# Patient Record
Sex: MALE | Race: WHITE | Employment: FULL TIME | URBAN - METROPOLITAN AREA
[De-identification: names, ages, dates, MRNs, and addresses within clinical notes are randomized per-mention and may not be internally consistent; named-entity substitution may affect disease eponyms.]

---

## 2021-02-17 ENCOUNTER — HOSPITAL ENCOUNTER (EMERGENCY)
Facility: MEDICAL CENTER | Age: 61
End: 2021-02-17
Attending: EMERGENCY MEDICINE
Payer: COMMERCIAL

## 2021-02-17 ENCOUNTER — APPOINTMENT (OUTPATIENT)
Dept: RADIOLOGY | Facility: MEDICAL CENTER | Age: 61
End: 2021-02-17
Attending: EMERGENCY MEDICINE
Payer: COMMERCIAL

## 2021-02-17 ENCOUNTER — APPOINTMENT (OUTPATIENT)
Dept: RADIOLOGY | Facility: MEDICAL CENTER | Age: 61
End: 2021-02-17
Attending: EMERGENCY MEDICINE

## 2021-02-17 VITALS
DIASTOLIC BLOOD PRESSURE: 77 MMHG | HEART RATE: 88 BPM | HEIGHT: 67 IN | BODY MASS INDEX: 28.25 KG/M2 | WEIGHT: 180 LBS | OXYGEN SATURATION: 94 % | TEMPERATURE: 98 F | RESPIRATION RATE: 20 BRPM | SYSTOLIC BLOOD PRESSURE: 123 MMHG

## 2021-02-17 DIAGNOSIS — F10.920 ALCOHOLIC INTOXICATION WITHOUT COMPLICATION (HCC): ICD-10-CM

## 2021-02-17 DIAGNOSIS — S09.90XA CLOSED HEAD INJURY, INITIAL ENCOUNTER: ICD-10-CM

## 2021-02-17 LAB
ALBUMIN SERPL BCP-MCNC: 4.9 G/DL (ref 3.2–4.9)
ALBUMIN/GLOB SERPL: 1.4 G/DL
ALP SERPL-CCNC: 72 U/L (ref 30–99)
ALT SERPL-CCNC: 174 U/L (ref 2–50)
ANION GAP SERPL CALC-SCNC: 27 MMOL/L (ref 7–16)
AST SERPL-CCNC: 121 U/L (ref 12–45)
BASOPHILS # BLD AUTO: 1 % (ref 0–1.8)
BASOPHILS # BLD: 0.06 K/UL (ref 0–0.12)
BILIRUB SERPL-MCNC: 0.4 MG/DL (ref 0.1–1.5)
BUN SERPL-MCNC: 10 MG/DL (ref 8–22)
CALCIUM SERPL-MCNC: 9.5 MG/DL (ref 8.5–10.5)
CHLORIDE SERPL-SCNC: 97 MMOL/L (ref 96–112)
CO2 SERPL-SCNC: 18 MMOL/L (ref 20–33)
CREAT SERPL-MCNC: 0.92 MG/DL (ref 0.5–1.4)
EOSINOPHIL # BLD AUTO: 0.12 K/UL (ref 0–0.51)
EOSINOPHIL NFR BLD: 2.1 % (ref 0–6.9)
ERYTHROCYTE [DISTWIDTH] IN BLOOD BY AUTOMATED COUNT: 54.4 FL (ref 35.9–50)
ETHANOL BLD-MCNC: 443.5 MG/DL (ref 0–10)
GLOBULIN SER CALC-MCNC: 3.4 G/DL (ref 1.9–3.5)
GLUCOSE SERPL-MCNC: 92 MG/DL (ref 65–99)
HCT VFR BLD AUTO: 54.3 % (ref 42–52)
HGB BLD-MCNC: 18.2 G/DL (ref 14–18)
IMM GRANULOCYTES # BLD AUTO: 0.02 K/UL (ref 0–0.11)
IMM GRANULOCYTES NFR BLD AUTO: 0.3 % (ref 0–0.9)
LYMPHOCYTES # BLD AUTO: 2.25 K/UL (ref 1–4.8)
LYMPHOCYTES NFR BLD: 39.2 % (ref 22–41)
MCH RBC QN AUTO: 35.5 PG (ref 27–33)
MCHC RBC AUTO-ENTMCNC: 33.5 G/DL (ref 33.7–35.3)
MCV RBC AUTO: 105.8 FL (ref 81.4–97.8)
MONOCYTES # BLD AUTO: 0.44 K/UL (ref 0–0.85)
MONOCYTES NFR BLD AUTO: 7.7 % (ref 0–13.4)
NEUTROPHILS # BLD AUTO: 2.85 K/UL (ref 1.82–7.42)
NEUTROPHILS NFR BLD: 49.7 % (ref 44–72)
NRBC # BLD AUTO: 0 K/UL
NRBC BLD-RTO: 0 /100 WBC
PLATELET # BLD AUTO: 215 K/UL (ref 164–446)
PMV BLD AUTO: 9.9 FL (ref 9–12.9)
POTASSIUM SERPL-SCNC: 4.4 MMOL/L (ref 3.6–5.5)
PROT SERPL-MCNC: 8.3 G/DL (ref 6–8.2)
RBC # BLD AUTO: 5.13 M/UL (ref 4.7–6.1)
SODIUM SERPL-SCNC: 142 MMOL/L (ref 135–145)
WBC # BLD AUTO: 5.7 K/UL (ref 4.8–10.8)

## 2021-02-17 PROCEDURE — A9576 INJ PROHANCE MULTIPACK: HCPCS | Performed by: EMERGENCY MEDICINE

## 2021-02-17 PROCEDURE — 80053 COMPREHEN METABOLIC PANEL: CPT

## 2021-02-17 PROCEDURE — 99285 EMERGENCY DEPT VISIT HI MDM: CPT

## 2021-02-17 PROCEDURE — 82077 ASSAY SPEC XCP UR&BREATH IA: CPT

## 2021-02-17 PROCEDURE — 74018 RADEX ABDOMEN 1 VIEW: CPT

## 2021-02-17 PROCEDURE — 700117 HCHG RX CONTRAST REV CODE 255: Performed by: EMERGENCY MEDICINE

## 2021-02-17 PROCEDURE — 71045 X-RAY EXAM CHEST 1 VIEW: CPT

## 2021-02-17 PROCEDURE — 85025 COMPLETE CBC W/AUTO DIFF WBC: CPT

## 2021-02-17 PROCEDURE — 70553 MRI BRAIN STEM W/O & W/DYE: CPT

## 2021-02-17 PROCEDURE — 70450 CT HEAD/BRAIN W/O DYE: CPT

## 2021-02-17 PROCEDURE — 70486 CT MAXILLOFACIAL W/O DYE: CPT

## 2021-02-17 RX ADMIN — GADOTERIDOL 15 ML: 279.3 INJECTION, SOLUTION INTRAVENOUS at 19:50

## 2021-02-17 ASSESSMENT — LIFESTYLE VARIABLES
DOES PATIENT WANT TO STOP DRINKING: NO
HOW MANY TIMES IN THE PAST YEAR HAVE YOU HAD 5 OR MORE DRINKS IN A DAY: 52
ON A TYPICAL DAY WHEN YOU DRINK ALCOHOL HOW MANY DRINKS DO YOU HAVE: 1
CONSUMPTION TOTAL: INCOMPLETE
DO YOU DRINK ALCOHOL: YES
AVERAGE NUMBER OF DAYS PER WEEK YOU HAVE A DRINK CONTAINING ALCOHOL: 1

## 2021-02-17 ASSESSMENT — PAIN SCALES - WONG BAKER: WONGBAKER_NUMERICALRESPONSE: DOESN'T HURT AT ALL

## 2021-02-17 NOTE — ED TRIAGE NOTES
"Graham Wilson  60 y.o.  Chief Complaint   Patient presents with   • ETOH Intoxication     Breathylyzer result 0.304   • Alcohol Intoxication       Pt BIB EMS for ETOH.  EMS states \"pt was on a zoom call for work when his coworkers watched him slump over on his keyboard.  They called EMS.\" EMS reported finding an empty 1/5 of vodka next to pt's desk and vodka in the pt's coffee cup.    Breathalyzer result in ED 0.304. Pt calm and compliant. Poor motor control at this time. Pt alert and orient to person and time.    Vitals:    02/17/21 1547   BP: 133/78   Pulse: 75   Resp: 20   Temp: 35.9 °C (96.7 °F)   SpO2: 91%     "

## 2021-02-17 NOTE — ED PROVIDER NOTES
ED Provider Note    CHIEF COMPLAINT  Chief Complaint   Patient presents with   • ETOH Intoxication     Breathylyzer result 0.304   • Alcohol Intoxication       HPI  Graham Wilson is a 60 y.o. male who presents for evaluation of altered mental status with head and facial injury.  The patient was brought in by paramedics.  Apparently he has been working from home.  He was on a Zoom call and apparently coworkers witnessed him slumped forward and hit his head against the desk with potential brief loss of consciousness.  Paramedics arrived and found the patient grossly intoxicated with a large glass of likely vodka with some sort of mixer in it.  The patient does not provide any significant or meaningful history.  No report of injury to the upper or lower extremities chest abdomen or pelvis.  No report of seizures    REVIEW OF SYSTEMS  See HPI for further details.  No night sweats weight loss numbness tingling weakness rash all other systems are negative.     PAST MEDICAL HISTORY  No past medical history on file.  No stated medical history  FAMILY HISTORY  None reported    SOCIAL HISTORY  Social History     Socioeconomic History   • Marital status:      Spouse name: Not on file   • Number of children: Not on file   • Years of education: Not on file   • Highest education level: Not on file   Occupational History   • Not on file   Tobacco Use   • Smoking status: Former Smoker     Types: Cigarettes   • Smokeless tobacco: Never Used   • Tobacco comment: quit smoking 25 years ago   Substance and Sexual Activity   • Alcohol use: Yes     Alcohol/week: 4.2 oz     Types: 7 Shots of liquor per week     Comment: Vodka   • Drug use: No   • Sexual activity: Not on file   Other Topics Concern   • Not on file   Social History Narrative   • Not on file     Social Determinants of Health     Financial Resource Strain:    • Difficulty of Paying Living Expenses:    Food Insecurity:    • Worried About Running Out of Food in the Last  "Year:    • Ran Out of Food in the Last Year:    Transportation Needs:    • Lack of Transportation (Medical):    • Lack of Transportation (Non-Medical):    Physical Activity:    • Days of Exercise per Week:    • Minutes of Exercise per Session:    Stress:    • Feeling of Stress :    Social Connections:    • Frequency of Communication with Friends and Family:    • Frequency of Social Gatherings with Friends and Family:    • Attends Sabianism Services:    • Active Member of Clubs or Organizations:    • Attends Club or Organization Meetings:    • Marital Status:    Intimate Partner Violence:    • Fear of Current or Ex-Partner:    • Emotionally Abused:    • Physically Abused:    • Sexually Abused:      Possible history of alcohol abuse  SURGICAL HISTORY  No past surgical history on file.    CURRENT MEDICATIONS  Home Medications     Reviewed by Amanda Terrazas Out, PhT (Pharmacy Tech) on 02/17/21 at 0549  Med List Status: Complete   Medication Last Dose Status        Patient Zeke Taking any Medications                       ALLERGIES  No Known Allergies    PHYSICAL EXAM  VITAL SIGNS: /77   Pulse 88   Temp 35.9 °C (96.7 °F) (Oral)   Resp 20   Ht 1.702 m (5' 7\")   Wt 81.6 kg (180 lb)   SpO2 94%   BMI 28.19 kg/m²       Constitutional: Well developed, Well nourished, No acute distress, Non-toxic appearance.   HENT: Ecchymosis and bruising to the forehead without step-off no laceration, bruising noted to the bridge of the nose without a septal hematoma or epistaxis, Bilateral external ears normal, Oropharynx moist, No oral exudates, Nose normal.   Eyes: PERRLA, EOMI, Conjunctiva normal, No discharge.   Neck: Normal range of motion, No midline bony tenderness, Supple, No stridor.   Cardiovascular: Normal heart rate, Normal rhythm, No murmurs, No rubs, No gallops.   Thorax & Lungs: Normal breath sounds, No respiratory distress, No wheezing, No chest tenderness.   Abdomen: Bowel sounds normal, Soft, No " tenderness, No masses, No pulsatile masses.   Skin: Warm, Dry, No erythema, No rash.   Extremities: Intact distal pulses, No edema, No tenderness, No cyanosis, No clubbing.   Musculoskeletal: Good range of motion in all major joints. No tenderness to palpation or major deformities noted.   Neurologic: On arrival the patient is somewhat sluggish but moving all extremities no seizure activity no focal neurological deficit    RADIOLOGY/PROCEDURES  DX-CHEST-PORTABLE (1 VIEW)   Final Result      1.  No acute cardiac or pulmonary abnormality is noted.      2.  No metallic foreign body identified.      [id number] VIEW   Final Result      No metallic foreign body identified in the abdomen or pelvis.      CT-HEAD W/O   Final Result      Increased density of a short segment of the superior sagittal sinus where a thrombus is not excluded. Further evaluation could be performed with MRI/MRV.      There are periventricular and subcortical white matter changes present.  This finding is nonspecific and could be from previous small vessel ischemia, demyelination, or gliosis.      Paranasal sinus disease.      CT-MAXILLOFACIAL W/O PLUS RECONS   Final Result      Small, nondisplaced left nasal bone fracture. No other maxillofacial fractures.      MR-BRAIN-WITH & W/O    (Results Pending)     Results for orders placed or performed during the hospital encounter of 02/17/21   DIAGNOSTIC ALCOHOL   Result Value Ref Range    Diagnostic Alcohol 443.5 (H) 0.0 - 10.0 mg/dL   CBC WITH DIFFERENTIAL   Result Value Ref Range    WBC 5.7 4.8 - 10.8 K/uL    RBC 5.13 4.70 - 6.10 M/uL    Hemoglobin 18.2 (H) 14.0 - 18.0 g/dL    Hematocrit 54.3 (H) 42.0 - 52.0 %    .8 (H) 81.4 - 97.8 fL    MCH 35.5 (H) 27.0 - 33.0 pg    MCHC 33.5 (L) 33.7 - 35.3 g/dL    RDW 54.4 (H) 35.9 - 50.0 fL    Platelet Count 215 164 - 446 K/uL    MPV 9.9 9.0 - 12.9 fL    Neutrophils-Polys 49.70 44.00 - 72.00 %    Lymphocytes 39.20 22.00 - 41.00 %    Monocytes 7.70 0.00 -  13.40 %    Eosinophils 2.10 0.00 - 6.90 %    Basophils 1.00 0.00 - 1.80 %    Immature Granulocytes 0.30 0.00 - 0.90 %    Nucleated RBC 0.00 /100 WBC    Neutrophils (Absolute) 2.85 1.82 - 7.42 K/uL    Lymphs (Absolute) 2.25 1.00 - 4.80 K/uL    Monos (Absolute) 0.44 0.00 - 0.85 K/uL    Eos (Absolute) 0.12 0.00 - 0.51 K/uL    Baso (Absolute) 0.06 0.00 - 0.12 K/uL    Immature Granulocytes (abs) 0.02 0.00 - 0.11 K/uL    NRBC (Absolute) 0.00 K/uL   Comp Metabolic Panel   Result Value Ref Range    Sodium 142 135 - 145 mmol/L    Potassium 4.4 3.6 - 5.5 mmol/L    Chloride 97 96 - 112 mmol/L    Co2 18 (L) 20 - 33 mmol/L    Anion Gap 27.0 (H) 7.0 - 16.0    Glucose 92 65 - 99 mg/dL    Bun 10 8 - 22 mg/dL    Creatinine 0.92 0.50 - 1.40 mg/dL    Calcium 9.5 8.5 - 10.5 mg/dL    AST(SGOT) 121 (H) 12 - 45 U/L    ALT(SGPT) 174 (H) 2 - 50 U/L    Alkaline Phosphatase 72 30 - 99 U/L    Total Bilirubin 0.4 0.1 - 1.5 mg/dL    Albumin 4.9 3.2 - 4.9 g/dL    Total Protein 8.3 (H) 6.0 - 8.2 g/dL    Globulin 3.4 1.9 - 3.5 g/dL    A-G Ratio 1.4 g/dL   ESTIMATED GFR   Result Value Ref Range    GFR If African American >60 >60 mL/min/1.73 m 2    GFR If Non African American >60 >60 mL/min/1.73 m 2        COURSE & MEDICAL DECISION MAKING  Pertinent Labs & Imaging studies reviewed. (See chart for details)  Patient presents here after head and facial injury after likely passing out from acute alcohol intoxication.  CT scan of the head did not demonstrate any obvious hemorrhage or skull fracture.  There was possible suggestion of sagittal sinus thrombosis for which MRV was recommended.  This was performed this evening.  Based on Dr. Cr's potation there is some subtle abnormalities that they will have the neuroradiologist located in the morning but no obvious large sagittal thrombosis or hemorrhage or signs of stroke.  The patient was profoundly intoxicated with a blood alcohol nearly 6 times the legal limit.  He also has some mild transaminitis  and elevated MCV.  The patient was observed for several hours.  At the time of discharge he had no significant ataxia and had no slurred speech.  I asked him if he remembered the events of the evening and afternoon and he has no recollection.  I suspect there is some significant and deleterious occult alcohol abuse going on.  The patient admits he has been drinking more heavily but is not a daily regular drinker.  We were able to contact the wife and she will drive him home.  I did offer alcohol cessation resources but he has declined at this time.  I will refer him to facial fracture for his nasal fracture and recommend he use over-the-counter nasal decongestions    FINAL IMPRESSION  1.  Acute alcohol intoxication  2.  Closed head injury  3.  Nondisplaced nasal bone fracture    Electronically signed by: Juancho Montenegro M.D., 2/17/2021 3:44 PM

## 2021-02-18 NOTE — ED NOTES
Med Rec completed per patient at bedside  Allergies reviewed:NKDA  No ORAL antibiotics in last 14 days    Patient reports no prescription or OTC medications. Will occasionally use Ibuprofen PRN but has not taken any in the last 14 days.

## 2021-02-18 NOTE — ED NOTES
Pt ambulating around the room with a steady gait, pt has dressed himself in his clothing and requesting to leave, pt encouraged to remain in the room until spouse could be contacted, pt agreed to wait in the room

## 2021-02-18 NOTE — ED NOTES
Pt provided urinal.  Pt requested privacy to use the urinal. Pt educated to stay on the bed while using the urinal. Bed locked, in lowest position. Side rails up and locked. This RN standing outside the door reminding pt not to stand. Pt stood. This RN assisted pt back into bed and with use of urinal. Non-skid socks provided.

## 2021-02-18 NOTE — ED PROVIDER NOTES
ED Provider Note    CHIEF COMPLAINT  Chief Complaint   Patient presents with   • ETOH Intoxication     Breathylyzer result 0.304   • Alcohol Intoxication       HPI  Graham Wilson is a 60 y.o. male who presents for evaluation of altered mental status with head and facial injury.  The patient was brought in by paramedics.  Apparently he has been working from home.  He was on a Zoom call and apparently coworkers witnessed him slumped forward and hit his head against the desk with potential brief loss of consciousness.  Paramedics arrived and found the patient grossly intoxicated with a large glass of likely vodka with some sort of mixer in it.  The patient does not provide any significant or meaningful history.  No report of injury to the upper or lower extremities chest abdomen or pelvis.  No report of seizures    REVIEW OF SYSTEMS  See HPI for further details.  No night sweats weight loss numbness tingling weakness rash all other systems are negative.     PAST MEDICAL HISTORY  No past medical history on file.  No stated medical history  FAMILY HISTORY  None reported    SOCIAL HISTORY  Social History     Socioeconomic History   • Marital status:      Spouse name: Not on file   • Number of children: Not on file   • Years of education: Not on file   • Highest education level: Not on file   Occupational History   • Not on file   Tobacco Use   • Smoking status: Former Smoker     Types: Cigarettes   • Smokeless tobacco: Never Used   • Tobacco comment: quit smoking 25 years ago   Substance and Sexual Activity   • Alcohol use: Yes     Alcohol/week: 4.2 oz     Types: 7 Shots of liquor per week     Comment: Vodka   • Drug use: No   • Sexual activity: Not on file   Other Topics Concern   • Not on file   Social History Narrative   • Not on file     Social Determinants of Health     Financial Resource Strain:    • Difficulty of Paying Living Expenses:    Food Insecurity:    • Worried About Running Out of Food in the Last  "Year:    • Ran Out of Food in the Last Year:    Transportation Needs:    • Lack of Transportation (Medical):    • Lack of Transportation (Non-Medical):    Physical Activity:    • Days of Exercise per Week:    • Minutes of Exercise per Session:    Stress:    • Feeling of Stress :    Social Connections:    • Frequency of Communication with Friends and Family:    • Frequency of Social Gatherings with Friends and Family:    • Attends Christian Services:    • Active Member of Clubs or Organizations:    • Attends Club or Organization Meetings:    • Marital Status:    Intimate Partner Violence:    • Fear of Current or Ex-Partner:    • Emotionally Abused:    • Physically Abused:    • Sexually Abused:      Possible history of alcohol abuse  SURGICAL HISTORY  No past surgical history on file.    CURRENT MEDICATIONS  Home Medications     Reviewed by Jhonatan Lares R.N. (Registered Nurse) on 02/17/21 at 1551  Med List Status: Partial   Medication Last Dose Status        Patient Zeke Taking any Medications                       ALLERGIES  No Known Allergies    PHYSICAL EXAM  VITAL SIGNS: /78   Pulse 74   Temp 35.9 °C (96.7 °F) (Oral)   Resp 19   Ht 1.702 m (5' 7\")   Wt 81.6 kg (180 lb)   SpO2 90%   BMI 28.19 kg/m²       Constitutional: Well developed, Well nourished, No acute distress, Non-toxic appearance.   HENT: Ecchymosis and bruising noted to the forehead, there is a deformity noted at the bridge of the nose without septal hematoma no leakage of clear fluid or CSF or blood, Bilateral external ears normal, Oropharynx moist, No oral exudates, Nose normal.   Eyes: PERRLA, EOMI, Conjunctiva normal, No discharge.   Neck: Normal range of motion, No tenderness, Supple, No stridor.   Cardiovascular: Normal heart rate, Normal rhythm, No murmurs, No rubs, No gallops.   Thorax & Lungs: Normal breath sounds, No respiratory distress, No wheezing, No chest tenderness.   Abdomen: Bowel sounds normal, Soft, No tenderness, " No masses, No pulsatile masses.   Skin: Warm, Dry, No erythema, No rash.   Extremities: Intact distal pulses, No edema, No tenderness, No cyanosis, No clubbing.   Musculoskeletal: Good range of motion in all major joints. No tenderness to palpation or major deformities noted.   Neurologic: Alert & oriented x 3, Normal motor function, Nor sluggish affect normal, Judgment normal, Mood normal.     CT-HEAD W/O   Final Result      Increased density of a short segment of the superior sagittal sinus where a thrombus is not excluded. Further evaluation could be performed with MRI/MRV.      There are periventricular and subcortical white matter changes present.  This finding is nonspecific and could be from previous small vessel ischemia, demyelination, or gliosis.      Paranasal sinus disease.      CT-MAXILLOFACIAL W/O PLUS RECONS   Final Result      Small, nondisplaced left nasal bone fracture. No other maxillofacial fractures.            COURSE & MEDICAL DECISION MAKING  Pertinent Labs & Imaging studies reviewed. (See chart for details)  ***    FINAL IMPRESSION  1. ***  2.   3.         Electronically signed by: Juancho Montenegro M.D., 2/17/2021 3:44 PM     denies

## 2021-03-09 ENCOUNTER — TELEMEDICINE (OUTPATIENT)
Dept: MEDICAL GROUP | Facility: MEDICAL CENTER | Age: 61
End: 2021-03-09
Payer: COMMERCIAL

## 2021-03-09 VITALS — BODY MASS INDEX: 28.04 KG/M2 | HEIGHT: 68 IN | WEIGHT: 185 LBS

## 2021-03-09 DIAGNOSIS — Z13.0 SCREENING FOR DEFICIENCY ANEMIA: ICD-10-CM

## 2021-03-09 DIAGNOSIS — Z13.29 THYROID DISORDER SCREEN: ICD-10-CM

## 2021-03-09 DIAGNOSIS — Z12.5 SCREENING FOR PROSTATE CANCER: ICD-10-CM

## 2021-03-09 DIAGNOSIS — Z78.9 ALCOHOL USE: ICD-10-CM

## 2021-03-09 DIAGNOSIS — Z13.220 LIPID SCREENING: ICD-10-CM

## 2021-03-09 DIAGNOSIS — Z12.11 COLON CANCER SCREENING: ICD-10-CM

## 2021-03-09 PROBLEM — K63.5 COLON POLYP: Status: ACTIVE | Noted: 2021-03-09

## 2021-03-09 PROCEDURE — 99203 OFFICE O/P NEW LOW 30 MIN: CPT | Mod: 95,CR | Performed by: NURSE PRACTITIONER

## 2021-03-09 ASSESSMENT — FIBROSIS 4 INDEX: FIB4 SCORE: 2.52

## 2021-03-09 ASSESSMENT — PATIENT HEALTH QUESTIONNAIRE - PHQ9: CLINICAL INTERPRETATION OF PHQ2 SCORE: 0

## 2021-03-09 NOTE — LETTER
Critical [facility] hospital  [facility].  [address]  [facility] [address]  Fax: [phone]   Authorization for Release/Disclosure of   Protected Health Information   Name: HERNESTO SUNSHINE : 1960 SSN: [id number]   Address: [address] [place], [place] [address] [address] Phone:    [phone] (home)    I authorize the entity listed below to release/disclose the PHI below to:   Critical access hospital/[facility] and [facility]   Provider or Entity Name:  [facility]   Address   [facility], Zip            [address] [place] [address] Phone:  [phone]      Fax:       [phone]          Reason for request: continuity of care   Information to be released:    [ X ] LAST COLONOSCOPY,  including any PATH REPORT and follow-up  [ X ] LAST FIT/COLOGUARD RESULT [  ] LAST DEXA  [  ] LAST MAMMOGRAM  [  ] LAST PAP  [  ] LAST LABS [  ] RETINA EXAM REPORT  [  ] IMMUNIZATION RECORDS  [  ] Release all info      [  ] Check here and initial the line next to each item to release ALL health information INCLUDING  _____ Care and treatment for drug and / or alcohol abuse  _____ HIV testing, infection status, or AIDS  _____ Genetic Testing    DATES OF SERVICE OR TIME PERIOD TO BE DISCLOSED: _____________  I understand and acknowledge that:  * This Authorization may be revoked at any time by you in writing, except if your health information has already been used or disclosed.  * Your health information that will be used or disclosed as a result of you signing this authorization could be re-disclosed by the recipient. If this occurs, your re-disclosed health information may no longer be protected by State or Federal laws.  * You may refuse to sign this Authorization. Your refusal will not affect your ability to obtain treatment.  * This Authorization becomes effective upon signing and will  on (date) __________.      If no date is indicated, this Authorization will  one (1) year  from the signature date.    Name: Graham Wilson    Signature:   Date:     3/9/2021       PLEASE FAX REQUESTED RECORDS BACK TO: [phone]

## 2021-03-09 NOTE — ASSESSMENT & PLAN NOTE
Recent ER visit d/t intoxication, notes reviewed.  Patient's blood alcohol level was 6 times the legal limit, he had apparently passed out at his desk while on a Zoom call and hit his head.  He acknowledges today that his alcohol assumption is a concern, he would like to begin working on this but is not sure where to start.  He is interested in counseling, has considered a 12-step program but is unsure if this will be the right fit for him.  He has been drinking 4-5 alcoholic beverages most days of the week.  States that for the most part he does not become intoxicated aside from this recent event.  He denies any withdrawal symptoms including shakiness, nausea, vomiting.  He does not feel that he needs a drink in the morning.  His mother has history of alcohol use disorder and attended AA for a period of time.  No other family members with substance use disorder.  He does not feel that he needs detox or inpatient treatment at this time.  He is not interested in medication such as naltrexone.  He would like to start with counseling    Liver function tests were elevated on recent ER visit, we will plan to recheck this

## 2021-03-09 NOTE — PROGRESS NOTES
Telemedicine: Established Patient   This evaluation was conducted via Zoom using secure and encrypted videoconferencing technology. The patient was in a private location in the state of [place].    The patient's identity was confirmed and verbal consent was obtained for this virtual visit.    Subjective:   CC:   Chief Complaint   Patient presents with   • Establish Care     no immediate concerns       Graham Wilson is a 60 y.o. male presenting for evaluation and management of:    Alcohol use  Recent ER visit d/t intoxication, notes reviewed.  Patient's blood alcohol level was 6 times the legal limit, he had apparently passed out at his desk while on a Zoom call and hit his head.  He acknowledges today that his alcohol assumption is a concern, he would like to begin working on this but is not sure where to start.  He is interested in counseling, has considered a 12-step program but is unsure if this will be the right fit for him.  He has been drinking 4-5 alcoholic beverages most days of the week.  States that for the most part he does not become intoxicated aside from this recent event.  He denies any withdrawal symptoms including shakiness, nausea, vomiting.  He does not feel that he needs a drink in the morning.  His mother has history of alcohol use disorder and attended AA for a period of time.  No other family members with substance use disorder.  He does not feel that he needs detox or inpatient treatment at this time.  He is not interested in medication such as naltrexone.  He would like to start with counseling    Liver function tests were elevated on recent ER visit, we will plan to recheck this    Colon polyp  H/o colonoscopy with polypectomy about 5 years ago, due for repeat colonoscopy        ROS      No Known Allergies    Current medicines (including changes today)  No current outpatient medications on file.     No current facility-administered medications for this visit.       Patient Active Problem  "List    Diagnosis Date Noted   • Alcohol use 03/09/2021   • Colon polyp 03/09/2021   • Healthy adult 06[birth year]29[birth year]2016       Family History   Problem Relation Age of Onset   • Cancer Father         lung ca   • Stroke Father    • Stroke Brother    • Diabetes Brother    • Arthritis Mother    • Alcohol abuse Mother    • Cancer Paternal Grandmother         leukemia       He  has no past medical history of Addisons disease (HCC), Allergy, Anemia, Anxiety, Arrhythmia, Arthritis, Asthma, Blood transfusion without reported diagnosis, Cancer (HCC), Cataract, CHF (congestive heart failure) (HCC), Clotting disorder (HCC), COPD (chronic obstructive pulmonary disease) (HCC), Cushings syndrome (HCC), Depression, Diabetes (HCC), Diabetic neuropathy (HCC), GERD (gastroesophageal reflux disease), Glaucoma, Goiter, Heart attack (HCC), Heart murmur, HIV (human immunodeficiency virus infection) (HCC), Hyperlipidemia, Hypertension, IBD (inflammatory bowel disease), Kidney disease, Meningitis, Migraine, Muscle disorder, Osteoporosis, Parathyroid disorder (HCC), Pituitary disease (HCC), Pulmonary emphysema (HCC), Seizure (HCC), Sickle cell disease (HCC), Stroke (HCC), Substance abuse (HCC), or Tuberculosis.  He  has no past surgical history on file.       Objective:   Ht 1.727 m (5' 8\")   Wt 83.9 kg (185 lb)   BMI 28.13 kg/m²     Physical Exam    Assessment and Plan:   The following treatment plan was discussed:     1. Alcohol use  Patient recognizes his alcohol consumption to be a concern and would like assistance with this.  Treatment options discussed including inpatient detox, counseling, AA resources, medication options such as naltrexone.  At this time he does not feel that he needs inpatient treatment, he has not experienced any detox symptoms in the past.  He plans to gradually cut back his alcohol use.  He would like to work with a therapist regarding this, phone number for [facility] specializes in addiction counseling " provided.  We have done discussed long-term risks associated with chronic alcohol consumption including risk for liver disease.  Liver enzymes were elevated on recent hospital evaluation, we will recheck this.  He may follow-up with me in the future if he would wish to consider either Antabuse or naltrexone, or if he feels that he needs a higher level of care.  - Comp Metabolic Panel; Future  - CBC WITH DIFFERENTIAL; Future    2. Lipid screening  - Lipid Profile; Future    3. Thyroid disorder screen  - TSH WITH REFLEX TO FT4; Future    4. Screening for deficiency anemia    5. Screening for prostate cancer  - PROSTATE SPECIFIC AG SCREENING; Future    6. Colon cancer screening  - REFERRAL TO GASTROENTEROLOGY        Follow-up: No follow-ups on file.

## 2021-03-15 DIAGNOSIS — Z23 NEED FOR VACCINATION: ICD-10-CM

## 2023-02-23 ENCOUNTER — HOSPITAL ENCOUNTER (EMERGENCY)
Facility: MEDICAL CENTER | Age: 63
End: 2023-02-24
Attending: EMERGENCY MEDICINE
Payer: COMMERCIAL

## 2023-02-23 DIAGNOSIS — F10.920 ALCOHOLIC INTOXICATION WITHOUT COMPLICATION (HCC): ICD-10-CM

## 2023-02-23 DIAGNOSIS — R45.851 SUICIDAL IDEATION: ICD-10-CM

## 2023-02-23 LAB
AMPHET UR QL SCN: NEGATIVE
BARBITURATES UR QL SCN: NEGATIVE
BENZODIAZ UR QL SCN: NEGATIVE
BZE UR QL SCN: NEGATIVE
CANNABINOIDS UR QL SCN: NEGATIVE
METHADONE UR QL SCN: NEGATIVE
OPIATES UR QL SCN: NEGATIVE
OXYCODONE UR QL SCN: NEGATIVE
PCP UR QL SCN: NEGATIVE
POC BREATHALIZER: 0.21 PERCENT (ref 0–0.01)
POC BREATHALIZER: 0.34 PERCENT (ref 0–0.01)
PROPOXYPH UR QL SCN: NEGATIVE

## 2023-02-23 PROCEDURE — 80307 DRUG TEST PRSMV CHEM ANLYZR: CPT

## 2023-02-23 PROCEDURE — 302970 POC BREATHALIZER: Performed by: EMERGENCY MEDICINE

## 2023-02-23 PROCEDURE — 99285 EMERGENCY DEPT VISIT HI MDM: CPT

## 2023-02-23 ASSESSMENT — FIBROSIS 4 INDEX: FIB4 SCORE: 2.96

## 2023-02-24 VITALS
OXYGEN SATURATION: 95 % | HEART RATE: 74 BPM | DIASTOLIC BLOOD PRESSURE: 89 MMHG | TEMPERATURE: 97.9 F | BODY MASS INDEX: 27.28 KG/M2 | HEIGHT: 68 IN | WEIGHT: 180 LBS | SYSTOLIC BLOOD PRESSURE: 159 MMHG | RESPIRATION RATE: 18 BRPM

## 2023-02-24 PROCEDURE — 90791 PSYCH DIAGNOSTIC EVALUATION: CPT

## 2023-02-24 NOTE — ED NOTES
Patient resting on stretcher in no acute distress. Equal chest rise noted. VS stable on monitor. Bed locked and in low position.

## 2023-02-24 NOTE — ED NOTES
Patient revitaled and reassessed. VSS. Denies any changes at this time. Patient in no signs of distress. Patient told to call with any changes in symptoms or condition. Patient verbalized understanding.

## 2023-02-24 NOTE — ED TRIAGE NOTES
"Chief Complaint   Patient presents with    Alcohol Intoxication     Pt reports ETOH use for the past week. States that he normally has \"two vodka drinks/day\"    Depression     Pt endorses SI some days but not today. Pt denies having a plan or intent to follow through with harming himself. Pt denies HI     Pt BIB EMS for above complaint. Per EMS, Wife called because the pt was verbalizing having suicidal ideations. Pt states that he has been heavily drinking for the past week and has had prior thoughts of self harm . Pt denies SI/HI currently.     Pt AOx4, GCS 15, VSS  /85   Pulse 74   Temp 36.9 °C (98.4 °F) (Temporal)   Resp 15   Ht 1.727 m (5' 8\")   Wt 81.6 kg (180 lb)   SpO2 89%   BMI 27.37 kg/m²     "

## 2023-02-24 NOTE — ED NOTES
Patient reassessed. VSS. Denies any changes at this time. Patient in no signs of distress. Patient told to call with any changes in symptoms or condition. Patient verbalized understanding.

## 2023-02-24 NOTE — DISCHARGE INSTRUCTIONS
Please get help for your alcoholism.  Do not drink so much.  If you have any thoughts about hurting yourself please return to the emergency department

## 2023-02-24 NOTE — ED PROVIDER NOTES
"  ER Provider Note    Scribed for Juwan Marcano M.D. by Anh Hill. 2/23/2023  4:41 PM    Primary Care Provider: SHELLY Stockton    CHIEF COMPLAINT  Chief Complaint   Patient presents with    Alcohol Intoxication     Pt reports ETOH use for the past week. States that he normally has \"two vodka drinks/day\"    Depression     Pt endorses SI some days but not today. Pt denies having a plan or intent to follow through with harming himself. Pt denies HI     EXTERNAL RECORDS REVIEWED  Outpatient Notes show that patient was seen at [facility] ED on 2/15/2023 after he was found down in the snow with a facial laceration. The laceration was repaired. The patient was referred to AA last year and loss consciousness on a Zoom call in February 2021.    HPI/ROS  OUTSIDE HISTORIAN(S):  Nursing staff    LIMITATION TO HISTORY   Select: Intoxication    Graham Wilson is a 62 y.o. male who presents to the ED for alcohol intoxication. The patient reports having 2-3 drinks of vodka today and drinks this amount everyday. He says that he felt suicidal earlier but not at this time. He denies homicidal ideation, visual hallucinations, or auditory hallucinations. He would like help to stop drinking. He denies history of PTSD or depression. No recent falls. He does not experience any fever, chills, cough, nausea, or vomiting.    Per RN, the patient has been drinking heavily over the past five weeks and made comments to his wife that he is suicidal. He has no plan, just has suicidal thoughts.     PAST MEDICAL HISTORY  No past medical history noted.    SURGICAL HISTORY  No past surgical history noted.    FAMILY HISTORY  Family History   Problem Relation Age of Onset    Cancer Father         lung ca    Stroke Father     Stroke Brother     Diabetes Brother     Arthritis Mother     Alcohol abuse Mother     Cancer Paternal Grandmother         leukemia       SOCIAL HISTORY   reports that he has quit smoking. His smoking use " "included cigarettes. He has never used smokeless tobacco. He reports current alcohol use of about 4.2 oz per week. He reports that he does not use drugs.    CURRENT MEDICATIONS  No current outpatient medications.    ALLERGIES  Patient has no known allergies.    PHYSICAL EXAM  /85   Pulse 74   Temp 36.9 °C (98.4 °F) (Temporal)   Resp 15   Ht 1.727 m (5' 8\")   Wt 81.6 kg (180 lb)   SpO2 89%   BMI 27.37 kg/m²     Constitutional: Well developed, Well nourished, Mild distress.   HENT: Normocephalic, Atraumatic, Oropharynx moist, No oral exudates.   Eyes: Conjunctiva normal, No discharge.   Cardiovascular: Normal heart rate, Normal rhythm, No murmurs, equal pulses.   Pulmonary: Normal breath sounds, No respiratory distress, No wheezing, No rales, No rhonchi.  Chest: No chest wall tenderness or deformity.   Abdomen: Soft, No tenderness, No masses, no rebound, no guarding.   Back: No CVA tenderness.   Musculoskeletal: No major deformities noted, No tenderness.   Skin: Warm, Dry, No erythema, No rash.   Neurologic: Alert & oriented x 3, Normal motor function,  No focal deficits noted.   Psychiatric: Seems intoxicated, Denies any suicidal thoughts currently but won't elaborate on what he told his wife.     DIAGNOSTIC STUDIES & PROCEDURES    Labs:   Results for orders placed or performed during the hospital encounter of 02/23/23   URINE DRUG SCREEN   Result Value Ref Range    Amphetamines Urine Negative Negative    Barbiturates Negative Negative    Benzodiazepines Negative Negative    Cocaine Metabolite Negative Negative    Methadone Negative Negative    Opiates Negative Negative    Oxycodone Negative Negative    Phencyclidine -Pcp Negative Negative    Propoxyphene Negative Negative    Cannabinoid Metab Negative Negative   POC BREATHALIZER   Result Value Ref Range    POC Breathalizer 0.343 (A) 0.00 - 0.01 Percent   POC BREATHALIZER   Result Value Ref Range    POC Breathalizer 0.208 (A) 0.00 - 0.01 Percent      All " labs reviewed by me.    COURSE & MEDICAL DECISION MAKING    ED Observation Status? Yes; I am placing the patient in to an observation status due to a diagnostic uncertainty as well as therapeutic intensity. Patient placed in observation status at 5:05 PM, 2/23/2023.     Observation plan is as follows: Metabolizing of alcohol and evaluation of suicidal thoughts.    Upon Reevaluation, the patient's condition has: Improved; and will be discharged.    Patient discharged from ED Observation status at 12:30 AM (Time) 2/24/2023  (Date).     INITIAL ASSESSMENT AND PLAN  Care Narrative:      5:05 PM - Patient seen and examined at bedside. Ordered POC Breathalizer and Urine Drug Screen to evaluate. The differential diagnoses include, but are not limited to: alcohol intoxication, suicidal ideation, depression, alcoholism.     Patient was evaluated by life skills at 11:45 PM.  At this point time patient is still denying that he is suicidal.  He did state he was interested in getting help for his alcoholism.  Therefore we will have the patient seen by peer crisis counselor for resources for substance abuse.    12:30 AM patient evaluated at bedside he is not suicidal or homicidal.  He states he does not want to go to an inpatient detox facility.  At this point time he felt that he is holdable.  Patient will be given resources for detox as an outpatient.       PROBLEM LIST AND DISPOSITION  #1 alcohol intoxication.  Patient appears to have a significant abuse history with alcohol at this point time we have offered him alcohol resources as well as inpatient detox facility.  Patient is clinically sober and denies wanting placement at this point in time.  Denies any previous history with seizures with alcohol detoxification.  At this point time I will discharge the patient home.    Suicidal ideation.  At this point time now the patient is sober he is denying any suicidal homicidal thoughts.  After life skills saw and evaluated the  patient is felt that he is not meet criteria for legal 2000.  Patient will be given resources.  Discharged home               DISPOSITION AND DISCUSSIONS    Discussion of management with other [facility] or appropriate source(s): SBIRT saw and evaluated the patient gave him resources for alcohol treatment facilities and Behavioral Health saw and evaluated the patient felt the patient was not meet criteria for legal 2000    Barriers to care at this time, including but not limited to:  Alcohol intoxication .     Decision tools and prescription drugs considered including, but not limited to:  Withdrawal medications but patient states he has never had a seizure with alcohol withdrawal. .     The patient will return for new or worsening symptoms and is stable at the time of discharge.    The patient is referred to a primary physician for blood pressure management, diabetic screening, and for all other preventative health concerns.        DISPOSITION:  Patient will be discharged home in stable condition.    FOLLOW UP:  SHELLY Stockton Bustamante   [facility] [address]  [facility] [id number] [phone]    Schedule an appointment as soon as possible for a visit in 1 week      [facility] [address] [place] [id number] [phone]    If you want help with detox      OUTPATIENT MEDICATIONS:  There are no discharge medications for this patient.        FINAL IMPRESSION   1. Alcoholic intoxication without complication (HCC)    2. Suicidal ideation        Anh SERRANO (Kindra), am scribing for, and in the presence of, STAS Howard*.    Electronically signed by: Anh Ashley), 2/23/2023    Juwan SERRANO M.* personally performed the services described in this documentation, as scribed by Anh Hill in my presence, and it is both accurate and complete.    The note accurately reflects work and decisions made by me.  Juwan Marcano M.D.  2/24/2023  1:52 AM

## 2023-02-24 NOTE — CONSULTS
"[facility]   TRIAGE ASSESSMENT    Name: Graham Wilson  MRN: [id number]  : 1960  Age: 62 y.o.  Date of assessment: 2023  PCP: RON Stockton.  Persons in attendance: Patient and Biological Mother  Patient Location: [facility]    CHIEF COMPLAINT/PRESENTING ISSUE (as stated by pt & bio mother): Pt is a 63 y/o male BIB EMS for alcohol intoxication and depression. Pt reports hx of alcohol abuse and endorses intermitted SI. Pt denies ever having a plan or intent to follow through with harming himself. Breathalyzer on arrival to ED was 0.343. At time of behavioral health consult, pt denies SI/HI/hallucinations. Pt is not currently receiving any outpatient psychiatric services. Denies hx of inpatient psychiatric hospitalizations. Pt went to rehab a year ago for ETOH. Pt lives with wife. Unemployed. Pt's wife states that he drinks all day and night and hides the alcohol from her. Reports minimal social support system. Pt's mom currently receiving cancer treatment and this has been really hard on the pt. Denies substance use; UDS negative for all substances. Met with peer recovery for ETOH detox resources and sobriety support. Contracts for safety. Findings discussed with ERP who agrees pt is safe to discharge home at this time; wife also agrees with plan of care. [employer] insurance plan. Outpatient psychiatric referral sent to [facility]. Taxi voucher provided for transportation home.   Chief Complaint   Patient presents with    Alcohol Intoxication     Pt reports ETOH use for the past week. States that he normally has \"two vodka drinks/day\"    Depression     Pt endorses SI some days but not today. Pt denies having a plan or intent to follow through with harming himself. Pt denies HI        CURRENT LIVING SITUATION/SOCIAL SUPPORT/FINANCIAL RESOURCES: Pt lives with wife. Unemployed. Pt's wife states that he drinks all day and night and hides the " alcohol from her. Reports minimal social support system. Pt's mom currently receiving cancer treatment and this has been really hard on the pt.     BEHAVIORAL HEALTH/SUBSTANCE USE TREATMENT HISTORY  Does patient/parent report a history of prior behavioral health/substance use treatment for patient?   Pt is not currently receiving any outpatient psychiatric services. Denies hx of inpatient psychiatric hospitalizations. Pt went to rehab a year ago for ETOH.     SAFETY ASSESSMENT - SELF  Does patient acknowledge current or past symptoms of dangerousness to self or is previous history noted? Yes; reports hx of suicidal thoughts.  Does parent/significant other report patient has current or past symptoms of dangerousness to self? N\A  Does presenting problem suggest symptoms of dangerousness to self? No; denies SI.    SAFETY ASSESSMENT - OTHERS  Does patient acknowledge current or past symptoms of aggressive behavior or risk to others or is previous history noted? no  Does parent/significant other report patient has current or past symptoms of aggressive behavior or risk to others?  N\A  Does presenting problem suggest symptoms of dangerousness to others? No; denies HI.    LEGAL HISTORY  Does patient acknowledge history of arrest/[facility]/shelter or is previous history noted? no    Crisis Safety Plan completed and copy given to patient? yes    ABUSE/NEGLECT SCREENING  Does patient report feeling “unsafe” in his/her home, or afraid of anyone?  no  Does patient report any history of physical, sexual, or emotional abuse?  no  Does parent or significant other report any of the above? N\A  Is there evidence of neglect by self?  no  Is there evidence of neglect by a caregiver? N/A  Does the patient/parent report any history of CPS/APS/police involvement related to suspected abuse/neglect or domestic violence? no  Based on the information provided during the current assessment, is a mandated report of suspected abuse/neglect being  "made?  No    SUBSTANCE USE SCREENING  Yes:  [place] all substances used in the past 30 days:      Last Use Amount   [x]   Alcohol 02/23/2023 Not specified   []   Marijuana     []   Heroin     []   Prescription Opioids  (used without prescription, for    recreation, or in excess of prescribed amount)     []   Other Prescription  (used without prescription, for    recreation, or in excess of prescribed amount)     []   Cocaine      []   Methamphetamine     []   \"\" drugs (ectasy, MDMA)     []   Other substances        UDS results: negative  Breathalyzer results: 0.343 @ 1615; 0.208 @ 2029    What consequences does the patient associate with any of the above substance use and or addictive behaviors? Relationship problems, Family problems, Health problems    Risk factors for detox (check all that apply):  []  Seizures   [x]  Diaphoretic (sweating)   []  Tremors   []  Hallucinations   [x]  Increased blood pressure   []  Decreased blood pressure   []  Other   []  None      [x] Patient education on risk factors for detoxification and instructed to return to ER as needed.      MENTAL STATUS   Participation: Active verbal participation, Attentive, Engaged, and Open to feedback  Grooming: Casual  Orientation: Alert and Fully Oriented  Behavior:  Restless  Eye contact: Good  Mood: Anxious  Affect: Anxious  Thought process: Logical and Goal-directed  Thought content: Within normal limits  Speech: Rate within normal limits and Volume within normal limits  Perception: Within normal limits  Memory:  Poor memory for chronology of events  Insight: Limited  Judgment:  Limited  Other:    Collateral information:   Source:  [] Significant other present in person:   [x] Wife, Meenakshi, by telephone: [phone]  [] [facility]   [x] [facility]  [x] [facility]  [x] Other:     [] Unable to complete full assessment due to:  [] Acute intoxication  [] Patient declined to participate/engage  [] Patient verbally " unresponsive  [] Significant cognitive deficits  [] Significant perceptual distortions or behavioral disorganization  [x] Other: N/A     CLINICAL IMPRESSIONS:  Primary:  SI-Resolved  Secondary:  Alcohol abuse       IDENTIFIED NEEDS/PLAN:  [Trigger DISPOSITION list for any items marked]    [x]  Imminent safety risk - self [] Imminent safety risk - others   []  Acute substance withdrawal []  Psychosis/Impaired reality testing   [x]  Mood/anxiety [x]  Substance use/Addictive behavior   [x]  Maladaptive behaviro []  Parent/child conflict   []  Family/Couples conflict []  Biomedical   []  Housing []  Financial   []   Legal  Occupational/Educational   []  Domestic violence []  Other:     Recommended Plan of Care:  Actively being addressed by [facility] and Refer to [facility]. At time of behavioral health consult, pt denies SI/HI/hallucinations. Met with peer recovery for ETOH detox resources and sobriety support. Contracts for safety. Findings discussed with ERP who agrees pt is safe to discharge home at this time; wife also agrees with plan of care. [employer] insurance plan. Outpatient psychiatric referral sent to [facility]. Taxi voucher provided for transportation home.       Has the Recommended Plan of Care/Level of Observation been reviewed with the patient's assigned nurse? Yes; no sitter needed; pt to be discharged    Does patient/parent or guardian express agreement with the above plan? yes      Referral appointment(s) scheduled? N\A    Alert team only:   I have discussed findings and recommendations with Dr. Marcano who is in agreement with these recommendations.     Referral information sent to the following outpatient community providers : [facility]    Referral information sent to the following inpatient community providers : N/A    If applicable : Referred  to  Alert Team for legal hold follow up at (time): N/A      Clau Treviño  R.N.  2/24/2023

## 2023-02-25 ENCOUNTER — OFFICE VISIT (OUTPATIENT)
Dept: URGENT CARE | Facility: CLINIC | Age: 63
End: 2023-02-25
Payer: COMMERCIAL

## 2023-02-25 VITALS
DIASTOLIC BLOOD PRESSURE: 76 MMHG | SYSTOLIC BLOOD PRESSURE: 128 MMHG | RESPIRATION RATE: 16 BRPM | HEART RATE: 67 BPM | OXYGEN SATURATION: 100 % | WEIGHT: 180 LBS | HEIGHT: 68 IN | BODY MASS INDEX: 27.28 KG/M2 | TEMPERATURE: 98.1 F

## 2023-02-25 DIAGNOSIS — S01.21XA LACERATION OF NOSE, INITIAL ENCOUNTER: ICD-10-CM

## 2023-02-25 DIAGNOSIS — S01.81XA FACIAL LACERATION, INITIAL ENCOUNTER: ICD-10-CM

## 2023-02-25 DIAGNOSIS — S01.81XA LACERATION OF FOREHEAD, INITIAL ENCOUNTER: ICD-10-CM

## 2023-02-25 PROCEDURE — 15853 REMOVAL SUTR/STAPL XREQ ANES: CPT | Performed by: FAMILY MEDICINE

## 2023-02-25 PROCEDURE — 99213 OFFICE O/P EST LOW 20 MIN: CPT | Performed by: FAMILY MEDICINE

## 2023-02-25 ASSESSMENT — ENCOUNTER SYMPTOMS
SORE THROAT: 0
FEVER: 0
MYALGIAS: 0
DIZZINESS: 0
VOMITING: 0
SHORTNESS OF BREATH: 0
CHILLS: 0
NAUSEA: 0
COUGH: 0

## 2023-02-25 ASSESSMENT — FIBROSIS 4 INDEX: FIB4 SCORE: 2.96

## 2023-02-25 NOTE — PROCEDURES
Suture Removal    Date/Time: 2/25/2023 10:17 AM  Performed by: Howie Patterson M.D.  Authorized by: Howie Ptaterson M.D.   Body area: head/neck  Location details: forehead  Wound Appearance: clean  Sutures Removed: 1 (Complex running suture)  Sutures placed in this facility: Sutures were placed in an outside facility.  Patient tolerance: patient tolerated the procedure well with no immediate complications

## 2023-02-25 NOTE — PROGRESS NOTES
Subjective:   Graham Wilson is a 62 y.o. male who presents for Suture / Staple Removal (X11 days ago got them at [facility] facial [facility])        Suture / Staple Removal  The sutures were placed 7 to 10 days ago (2/14/2023 by plastic surgery with hospital admission from initial evaluation in the emergency department, initial injury from falling down in the snow). He tried nothing since the wound repair. Maximum temperature: Denies fever. There has been no drainage from the wound. There is no redness present. There is no swelling present. There is no pain present. He has no difficulty moving (Reports numbness right side of face and area of maxillary suture) the affected extremity or digit.   PMH:  has no past medical history of Addisons disease ([facility]), Allergy, Anemia, Anxiety, Arrhythmia, Arthritis, Asthma, Blood transfusion without reported diagnosis, Cancer ([facility]), Cataract, CHF (congestive heart failure) ([facility]), Clotting disorder ([facility]), COPD (chronic obstructive pulmonary disease) ([facility]), Cushings syndrome ([facility]), Depression, Diabetes ([facility]), Diabetic neuropathy ([facility]), GERD (gastroesophageal reflux disease), Glaucoma, Goiter, Heart attack ([facility]), Heart murmur, HIV (human immunodeficiency virus infection) ([facility]), Hyperlipidemia, Hypertension, IBD (inflammatory bowel disease), Kidney disease, Meningitis, Migraine, Muscle disorder, Osteoporosis, Parathyroid disorder ([facility]), Pituitary disease ([facility]), Pulmonary emphysema ([facility]), Seizure ([facility]), Sickle cell disease ([facility]), Stroke ([facility]), Substance abuse ([facility]), or Tuberculosis.  MEDS: No current outpatient medications on file.  ALLERGIES: No Known Allergies  SURGHX: History reviewed. No pertinent surgical history.  SOCHX:  reports that he has quit smoking. His smoking use included cigarettes. He has never used smokeless tobacco. He reports current alcohol use of about 4.2 oz per week. He reports that he does not use drugs.  FH:   Family History   Problem Relation Age of Onset     "Cancer Father         lung ca    Stroke Father     Stroke Brother     Diabetes Brother     Arthritis Mother     Alcohol abuse Mother     Cancer Paternal Grandmother         leukemia     Review of Systems   Constitutional:  Negative for chills and fever.   HENT:  Negative for sore throat.    Respiratory:  Negative for cough and shortness of breath.    Gastrointestinal:  Negative for nausea and vomiting.   Musculoskeletal:  Negative for myalgias.   Skin:  Negative for rash.   Neurological:  Negative for dizziness.      Objective:   /76 (BP Location: Left arm, Patient Position: Sitting, BP Cuff Size: Adult)   Pulse 67   Temp 36.7 °C (98.1 °F) (Temporal)   Resp 16   Ht 1.727 m (5' 8\")   Wt 81.6 kg (180 lb)   SpO2 100%   BMI 27.37 kg/m²   Physical Exam  Vitals and nursing note reviewed.   Constitutional:       General: He is not in acute distress.     Appearance: He is well-developed.   HENT:      Head: Normocephalic and atraumatic.        Comments: Decree sensation to light touch maxillary at laceration site and periphery     Right Ear: External ear normal.      Left Ear: External ear normal.      Nose: Nose normal.      Mouth/Throat:      Mouth: Mucous membranes are moist.   Eyes:      Conjunctiva/sclera: Conjunctivae normal.   Cardiovascular:      Rate and Rhythm: Normal rate.   Pulmonary:      Effort: Pulmonary effort is normal. No respiratory distress.      Breath sounds: Normal breath sounds.   Abdominal:      General: There is no distension.   Musculoskeletal:         General: Normal range of motion.   Skin:     General: Skin is warm and dry.   Neurological:      General: No focal deficit present.      Mental Status: He is alert and oriented to person, place, and time. Mental status is at baseline.      Gait: Gait (gait at baseline) normal.   Psychiatric:         Judgment: Judgment normal.         Assessment/Plan:   1. Laceration of forehead, initial encounter    2. Laceration of nose, initial " encounter    3. Facial laceration, initial encounter        Medical Decision Making/Course:  In the course of preparing for this visit with review of the pertinent past medical history, recent and past clinic visits, current medications, and performing chart, immunization, medical history and medication reconciliation, and in the further course of obtaining the current history pertinent to the clinic visit today, performing an exam and evaluation, ordering and independently evaluating labs, tests  , and/or procedures including complex suture removal involving the use of magnification see procedure note, prescribing any recommended new medications as noted above, providing any pertinent counseling and education and recommending further coordination of care, at least  45 minutes of total time were spent during this encounter.      Discussed close monitoring, return precautions, and supportive measures of maintaining adequate fluid hydration and caloric intake, relative rest and symptom management as needed for pain and/or fever.    Differential diagnosis, natural history, supportive care, and indications for immediate follow-up discussed.     Advised the patient to follow-up with the primary care physician for recheck, reevaluation, and consideration of further management.    Please note that this dictation was created using voice recognition software. I have worked with consultants from the vendor as well as technical experts from [employer] [facility] [employer] to optimize the interface. I have made every reasonable attempt to correct obvious errors, but I expect that there are errors of grammar and possibly content that I did not discover before finalizing the note.

## 2023-02-25 NOTE — PROCEDURES
Suture Removal    Date/Time: 2/25/2023 10:20 AM  Performed by: Howie Patterson M.D.  Authorized by: Howie Patterson M.D.   Body area: head/neck  Location details: right cheek  Wound Appearance: clean (5 cm maxillary face)  Sutures Removed: 1 (1 complex running)  Sutures placed in this facility: Sutures were placed at an outside facility.  Patient tolerance: patient tolerated the procedure well with no immediate complications

## 2023-02-25 NOTE — PROCEDURES
Suture Removal    Date/Time: 2/25/2023 10:19 AM  Performed by: Howie Patterson M.D.  Authorized by: Howie Patterson M.D.   Body area: head/neck  Location details: nose  Wound Appearance: clean  Sutures Removed: 2  Sutures placed in this facility: Sutures were placed at an outside facility.  Patient tolerance: patient tolerated the procedure well with no immediate complications